# Patient Record
Sex: MALE | Race: WHITE | NOT HISPANIC OR LATINO | Employment: FULL TIME | URBAN - METROPOLITAN AREA
[De-identification: names, ages, dates, MRNs, and addresses within clinical notes are randomized per-mention and may not be internally consistent; named-entity substitution may affect disease eponyms.]

---

## 2022-10-24 ENCOUNTER — TELEPHONE (OUTPATIENT)
Dept: NEUROSURGERY | Facility: CLINIC | Age: 42
End: 2022-10-24
Payer: COMMERCIAL

## 2022-10-24 NOTE — TELEPHONE ENCOUNTER
Spoke with patient. Let him know we would need imaging before scheduling. [place] [facility] [place] along with image reports.

## 2022-10-24 NOTE — TELEPHONE ENCOUNTER
----- Message from Elsie Angulo sent at 10/24/2022  2:08 PM CDT -----  Regarding: appointment  Contact: patient  Type:  Sooner Appointment Request    Caller is requesting a sooner appointment.  Caller declined first available appointment listed below.  Caller will not accept being placed on the waitlist and is requesting a message be sent to doctor.    Name of Caller:  patient  When is the first available appointment?    Symptoms:  pretruding disc pressing against spinal cord  Best Call Back Number:  [phone] (home)   Additional Information:  Patient has had a MRI on lumbar and thoracic. Patient would like to get in with a surgeon before 11/01/22 if possible.  Dr Byers is referring the doctor. He is trying to get with a surgeon that does minimal evasive procedures. Please call patient to advise.Thanks!